# Patient Record
Sex: FEMALE | Race: WHITE | URBAN - METROPOLITAN AREA
[De-identification: names, ages, dates, MRNs, and addresses within clinical notes are randomized per-mention and may not be internally consistent; named-entity substitution may affect disease eponyms.]

---

## 2022-09-07 ENCOUNTER — OFFICE VISIT (OUTPATIENT)
Dept: URBAN - METROPOLITAN AREA CLINIC 26 | Facility: CLINIC | Age: 52
End: 2022-09-07
Payer: MEDICAID

## 2022-09-07 DIAGNOSIS — H16.223 KERATOCONJUNCTIVITIS SICCA, BILATERAL: Primary | ICD-10-CM

## 2022-09-07 DIAGNOSIS — H52.4 PRESBYOPIA: ICD-10-CM

## 2022-09-07 DIAGNOSIS — H43.393 OTHER VITREOUS OPACITIES, BILATERAL: ICD-10-CM

## 2022-09-07 PROCEDURE — 92134 CPTRZ OPH DX IMG PST SGM RTA: CPT | Performed by: OPTOMETRIST

## 2022-09-07 PROCEDURE — 92004 COMPRE OPH EXAM NEW PT 1/>: CPT | Performed by: OPTOMETRIST

## 2022-09-07 RX ORDER — LOTEPREDNOL ETABONATE 2.5 MG/ML
0.25 % SUSPENSION/ DROPS OPHTHALMIC
Qty: 5 | Refills: 0 | Status: ACTIVE
Start: 2022-09-07

## 2022-09-07 RX ORDER — CYCLOSPORINE 0.5 MG/ML
0.05 % EMULSION OPHTHALMIC
Qty: 180 | Refills: 3 | Status: INACTIVE
Start: 2022-09-07 | End: 2022-12-05

## 2022-09-07 ASSESSMENT — VISUAL ACUITY
OD: 20/20
OS: 20/20

## 2022-09-07 ASSESSMENT — KERATOMETRY
OD: 44.00
OS: 43.50

## 2022-09-07 ASSESSMENT — INTRAOCULAR PRESSURE
OS: 17
OD: 19

## 2022-09-07 NOTE — IMPRESSION/PLAN
Impression: Keratoconjunctivitis sicca, bilateral: P60.239. Plan: Failed on tears alone. Recommend at's 4-5 times a day, otc omega 3s. Avoid overhead fans at bedtime. Drink plenty water. Start eysuvis qid OU x 1 week, bid ou x 1 week, then d/c. rx restasis bid OU.

## 2022-10-26 ENCOUNTER — OFFICE VISIT (OUTPATIENT)
Dept: URBAN - METROPOLITAN AREA CLINIC 10 | Facility: CLINIC | Age: 52
End: 2022-10-26
Payer: MEDICAID

## 2022-10-26 DIAGNOSIS — H16.223 KERATOCONJUNCTIVITIS SICCA, BILATERAL: Primary | ICD-10-CM

## 2022-10-26 PROCEDURE — 99214 OFFICE O/P EST MOD 30 MIN: CPT | Performed by: OPTOMETRIST

## 2022-10-26 RX ORDER — LIFITEGRAST 50 MG/ML
5 % SOLUTION/ DROPS OPHTHALMIC
Qty: 60 | Refills: 11 | Status: ACTIVE
Start: 2022-10-26

## 2022-10-26 ASSESSMENT — INTRAOCULAR PRESSURE
OD: 13
OS: 13

## 2022-10-26 NOTE — IMPRESSION/PLAN
Impression: Keratoconjunctivitis sicca, bilateral: M24.496. Plan: No improvement with artificial tears. Improvement in symptoms with steroid. Recommend otc omega 3s. Avoid overhead fans at bedtime. Drink plenty water. Restasis unable to fill. Trial Xiidra BID OU. RTC 4-6 weeks short f/u.

## 2023-11-02 ENCOUNTER — OFFICE VISIT (OUTPATIENT)
Dept: URBAN - METROPOLITAN AREA CLINIC 56 | Facility: LOCATION | Age: 53
End: 2023-11-02
Payer: MEDICAID

## 2023-11-02 DIAGNOSIS — H43.393 OTHER VITREOUS OPACITIES, BILATERAL: ICD-10-CM

## 2023-11-02 DIAGNOSIS — H25.813 COMBINED FORMS OF AGE-RELATED CATARACT, BILATERAL: ICD-10-CM

## 2023-11-02 DIAGNOSIS — H16.223 KERATOCONJUNCTIVITIS SICCA, NOT SPECIFIED AS SJOGREN'S, BILATERAL: Primary | ICD-10-CM

## 2023-11-02 PROCEDURE — 92134 CPTRZ OPH DX IMG PST SGM RTA: CPT | Performed by: STUDENT IN AN ORGANIZED HEALTH CARE EDUCATION/TRAINING PROGRAM

## 2023-11-02 PROCEDURE — 92014 COMPRE OPH EXAM EST PT 1/>: CPT | Performed by: STUDENT IN AN ORGANIZED HEALTH CARE EDUCATION/TRAINING PROGRAM

## 2023-11-02 ASSESSMENT — INTRAOCULAR PRESSURE
OS: 20
OD: 20

## 2023-11-02 ASSESSMENT — KERATOMETRY
OS: 43.48
OD: 43.83